# Patient Record
Sex: FEMALE | Race: WHITE
[De-identification: names, ages, dates, MRNs, and addresses within clinical notes are randomized per-mention and may not be internally consistent; named-entity substitution may affect disease eponyms.]

---

## 2017-07-07 ENCOUNTER — HOSPITAL ENCOUNTER (OUTPATIENT)
Dept: HOSPITAL 45 - C.LAB1850 | Age: 82
Discharge: HOME | End: 2017-07-07
Attending: INTERNAL MEDICINE
Payer: COMMERCIAL

## 2017-07-07 DIAGNOSIS — E78.5: Primary | ICD-10-CM

## 2017-07-07 DIAGNOSIS — R31.9: ICD-10-CM

## 2017-07-07 LAB
ALBUMIN/GLOB SERPL: 1.1 {RATIO} (ref 0.9–2)
ALP SERPL-CCNC: 69 U/L (ref 45–117)
ALT SERPL-CCNC: 20 U/L (ref 12–78)
ANION GAP SERPL CALC-SCNC: 4 MMOL/L (ref 3–11)
APPEARANCE UR: CLEAR
AST SERPL-CCNC: 17 U/L (ref 15–37)
BILIRUB UR-MCNC: (no result) MG/DL
BUN SERPL-MCNC: 18 MG/DL (ref 7–18)
BUN/CREAT SERPL: 24.7 (ref 10–20)
CALCIUM SERPL-MCNC: 9.2 MG/DL (ref 8.5–10.1)
CHLORIDE SERPL-SCNC: 107 MMOL/L (ref 98–107)
CHOLEST/HDLC SERPL: 2.6 {RATIO}
CO2 SERPL-SCNC: 30 MMOL/L (ref 21–32)
COLOR UR: YELLOW
CREAT SERPL-MCNC: 0.74 MG/DL (ref 0.6–1.2)
GLOBULIN SER-MCNC: 3.6 GM/DL (ref 2.5–4)
GLUCOSE SERPL-MCNC: 73 MG/DL (ref 70–99)
GLUCOSE UR QL: 95 MG/DL
KETONES UR QL STRIP: 119 MG/DL
MANUAL MICROSCOPIC REQUIRED?: NO
NITRITE UR QL STRIP: (no result)
NITRITE UR QL STRIP: 143 MG/DL (ref 0–150)
PH UR STRIP: 5 [PH] (ref 4.5–7.5)
PH UR: 243 MG/DL (ref 0–200)
POTASSIUM SERPL-SCNC: 3.9 MMOL/L (ref 3.5–5.1)
REVIEW REQ?: NO
SODIUM SERPL-SCNC: 141 MMOL/L (ref 136–145)
SP GR UR STRIP: 1.03 (ref 1–1.03)
UROBILINOGEN UR-MCNC: (no result) MG/DL
VERY LOW DENSITY LIPOPROT CALC: 29 MG/DL
ZZUR CULT IF INDIC CLEAN CATCH: NO

## 2017-07-17 ENCOUNTER — HOSPITAL ENCOUNTER (OUTPATIENT)
Dept: HOSPITAL 45 - C.LAB1850 | Age: 82
Discharge: HOME | End: 2017-07-17
Attending: INTERNAL MEDICINE
Payer: COMMERCIAL

## 2017-07-17 DIAGNOSIS — R31.9: Primary | ICD-10-CM

## 2017-07-31 ENCOUNTER — HOSPITAL ENCOUNTER (OUTPATIENT)
Dept: HOSPITAL 45 - C.PATHSPEC | Age: 82
Discharge: HOME | End: 2017-07-31
Attending: NURSE PRACTITIONER
Payer: COMMERCIAL

## 2017-07-31 DIAGNOSIS — R31.29: Primary | ICD-10-CM

## 2017-08-02 ENCOUNTER — HOSPITAL ENCOUNTER (OUTPATIENT)
Dept: HOSPITAL 45 - C.ULTR | Age: 82
Discharge: HOME | End: 2017-08-02
Attending: NURSE PRACTITIONER
Payer: COMMERCIAL

## 2017-08-02 DIAGNOSIS — R31.29: Primary | ICD-10-CM

## 2017-08-02 DIAGNOSIS — N20.0: ICD-10-CM

## 2017-08-02 NOTE — DIAGNOSTIC IMAGING REPORT
EXAMINATION: RENAL ULTRASOUND



CLINICAL HISTORY: R31.29 [id number] nephrolithiasis



COMPARISON STUDY:  MRI dated 8/26/2016



FINDINGS: The right kidney measures 10.0. The left kidney measures 9.9.  There

is no evidence of hydronephrosis.  There are multiple left renal cysts measuring

up to 3.7 cm in diameter. No solid renal masses are visualized.



No bladder abnormalities are visualized.  Bilateral ureteral jets were

visualized.



Incidental note is made of a septated cyst within the right hepatic lobe. There

is also a 2 cm left adnexal cyst.

                 

IMPRESSION : 

1. Multiple left renal cysts measuring up to 3.7 cm

2. No evidence of obstruction

3. No solid renal masses identified

4. Right lobe hepatic cyst

5. 2 cm left adnexal cyst







Electronically signed by:  Yosi Rosenberg M.D.

8/2/2017 11:03 AM



Dictated Date/Time:  8/2/2017 11:01 AM

## 2017-08-29 ENCOUNTER — HOSPITAL ENCOUNTER (OUTPATIENT)
Dept: HOSPITAL 45 - C.LAB1850 | Age: 82
Discharge: HOME | End: 2017-08-29
Attending: INTERNAL MEDICINE
Payer: COMMERCIAL

## 2017-08-29 DIAGNOSIS — M62.89: ICD-10-CM

## 2017-08-29 DIAGNOSIS — T14.8: Primary | ICD-10-CM

## 2017-08-29 DIAGNOSIS — E53.8: ICD-10-CM

## 2017-08-29 DIAGNOSIS — E55.9: ICD-10-CM
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
## 2017-08-29 LAB
ALBUMIN/GLOB SERPL: 1 {RATIO} (ref 0.9–2)
ALP SERPL-CCNC: 69 U/L (ref 45–117)
ALT SERPL-CCNC: 14 U/L (ref 12–78)
ANION GAP SERPL CALC-SCNC: 4 MMOL/L (ref 3–11)
AST SERPL-CCNC: 17 U/L (ref 15–37)
BASOPHILS # BLD: 0.03 K/UL (ref 0–0.2)
BASOPHILS NFR BLD: 0.5 %
BUN SERPL-MCNC: 16 MG/DL (ref 7–18)
BUN/CREAT SERPL: 22.2 (ref 10–20)
CALCIUM SERPL-MCNC: 9.4 MG/DL (ref 8.5–10.1)
CHLORIDE SERPL-SCNC: 106 MMOL/L (ref 98–107)
CO2 SERPL-SCNC: 30 MMOL/L (ref 21–32)
COMPLETE: YES
CREAT SERPL-MCNC: 0.72 MG/DL (ref 0.6–1.2)
EOSINOPHIL NFR BLD AUTO: 241 K/UL (ref 130–400)
GLOBULIN SER-MCNC: 3.7 GM/DL (ref 2.5–4)
GLUCOSE SERPL-MCNC: 77 MG/DL (ref 70–99)
HCT VFR BLD CALC: 42.7 % (ref 37–47)
IG%: 0.2 %
IMM GRANULOCYTES NFR BLD AUTO: 22.8 %
LYME DISEASE AB IGG: (no result)
LYME DISEASE AB IGM: (no result)
LYMPHOCYTES # BLD: 1.36 K/UL (ref 1.2–3.4)
MAGNESIUM SERPL-MCNC: 2.3 MG/DL (ref 1.8–2.4)
MCH RBC QN AUTO: 32.2 PG (ref 25–34)
MCHC RBC AUTO-ENTMCNC: 32.3 G/DL (ref 32–36)
MCV RBC AUTO: 99.5 FL (ref 80–100)
MONOCYTES NFR BLD: 13.3 %
NEUTROPHILS # BLD AUTO: 1 %
NEUTROPHILS NFR BLD AUTO: 62.2 %
PMV BLD AUTO: 10.3 FL (ref 7.4–10.4)
POTASSIUM SERPL-SCNC: 4.2 MMOL/L (ref 3.5–5.1)
RBC # BLD AUTO: 4.29 M/UL (ref 4.2–5.4)
SODIUM SERPL-SCNC: 140 MMOL/L (ref 136–145)
WBC # BLD AUTO: 5.96 K/UL (ref 4.8–10.8)

## 2018-03-20 LAB
BASOPHILS # BLD: 0.04 K/UL (ref 0–0.2)
BASOPHILS NFR BLD: 0.7 %
BUN SERPL-MCNC: 16 MG/DL (ref 7–18)
CALCIUM SERPL-MCNC: 9.2 MG/DL (ref 8.5–10.1)
CO2 SERPL-SCNC: 30 MMOL/L (ref 21–32)
CREAT SERPL-MCNC: 0.79 MG/DL (ref 0.6–1.2)
EOS ABS #: 0.08 K/UL (ref 0–0.5)
EOSINOPHIL NFR BLD AUTO: 248 K/UL (ref 130–400)
GLUCOSE SERPL-MCNC: 128 MG/DL (ref 70–99)
HCT VFR BLD CALC: 39.8 % (ref 37–47)
HGB BLD-MCNC: 13.2 G/DL (ref 12–16)
IG#: 0 K/UL (ref 0–0.02)
IMM GRANULOCYTES NFR BLD AUTO: 29.3 %
INR PPP: 1 (ref 0.9–1.1)
LYMPHOCYTES # BLD: 1.67 K/UL (ref 1.2–3.4)
MCH RBC QN AUTO: 32.6 PG (ref 25–34)
MCHC RBC AUTO-ENTMCNC: 33.2 G/DL (ref 32–36)
MCV RBC AUTO: 98.3 FL (ref 80–100)
MONO ABS #: 0.59 K/UL (ref 0.11–0.59)
MONOCYTES NFR BLD: 10.4 %
NEUT ABS #: 3.32 K/UL (ref 1.4–6.5)
NEUTROPHILS # BLD AUTO: 1.4 %
NEUTROPHILS NFR BLD AUTO: 58.2 %
PMV BLD AUTO: 9.7 FL (ref 7.4–10.4)
POTASSIUM SERPL-SCNC: 4.5 MMOL/L (ref 3.5–5.1)
PTT PATIENT: 27.3 SECONDS (ref 21–31)
RED CELL DISTRIBUTION WIDTH CV: 12.9 % (ref 11.5–14.5)
RED CELL DISTRIBUTION WIDTH SD: 46.5 FL (ref 36.4–46.3)
SODIUM SERPL-SCNC: 140 MMOL/L (ref 136–145)
WBC # BLD AUTO: 5.7 K/UL (ref 4.8–10.8)

## 2018-03-20 NOTE — DIAGNOSTIC IMAGING REPORT
CHEST 2 VIEWS ROUTINE



CLINICAL HISTORY: 85 years-old Female presenting with preoperative assessment. 



TECHNIQUE: PA and lateral views of the chest were obtained.



COMPARISON: 8/14/2014.



FINDINGS:

Atherosclerosis of the aortic arch. Cardiac silhouette normal in size. Coronary

artery calcification or coronary stents noted. Possible mitral annular

calcification. Lungs and pleural spaces clear. Degenerative changes of the

thoracic spine. Mild compression deformity of a lower thoracic vertebral body

suggesting underlying osteopenia. Deformity and subluxation of the right humeral

head, which may be chronic. Upper abdomen normal.



IMPRESSION:

1.  No acute cardiopulmonary disease.







Electronically signed by:  Chandana Maxwell M.D.

3/20/2018 1:02 PM



Dictated Date/Time:  3/20/2018 1:00 PM

## 2018-03-20 NOTE — PAT MEDICATION INSTRUCTIONS
Service Date


Mar 20, 2018.





Current Home Medication List


Acetaminophen (Tylenol Arthritis Ext Rel), 650 MG PO Q8H


Aspirin (Aspirin Ec), 81 MG PO HS


Cholecalciferol (Vitamin D3), 1 CAP PO QPM


Coenzyme Q10 (Ubidecarenone) (Co Q10), 2 CAP PO QPM


Cyanocobalamin (Vitamin B12), 1 TAB SL QPM


Fish Oil (Omega-3), 1 CAP PO QPM





Medication Instructions


For Your Scheduled Surgery 





- Hold the following medications 2 weeks prior to surgery--stop taking on 04/12/ 18:


Coenzyme Q10 (Ubidecarenone) (Co Q10), 2 CAP PO QPM


Fish Oil ([place]-3), 1 CAP PO QPM








- Take the following medications if needed the morning of surgery:


Acetaminophen (Tylenol Arthritis Ext Rel), 650 MG PO Q8H (can be taken up to 

four hours before surgery)





- Take the following medications as scheduled the night before surgery:


Acetaminophen (Tylenol Arthritis Ext Rel), 650 MG PO Q8H (if needed)


Aspirin (Aspirin Ec), 81 MG PO HS


Cholecalciferol (Vitamin D3), 1 CAP PO QPM


Cyanocobalamin (Vitamin B12), 1 TAB SL QPM








If you have any questions please call us at [phone] or [phone] or 

[phone]

## 2018-04-21 NOTE — HISTORY & PHYSICAL EXAMINATION
DATE OF ADMISSION:  04/26/2018

 

CHIEF COMPLAINT:  Persistent left knee pain and discomfort.

 

HISTORY OF PRESENT ILLNESS:  An 85-year-old female who now presents for

surgical treatment of her left knee.  She has got a long history of left knee

pain and discomfort followed by Dr. Gregory up to this point.  She has had

multiple steroid shots and gel injections, which really do help at all

anymore.  Pain is mostly on the medial side of her knee.  It is increased

with weightbearing.  The more she walks, the more it hurts.  It is really

starting to affect her quality of life and ability to be active.  She would

like to have her left hip fixed.

 

The patient does have an atrial septal aneurysm, followed by Dr. Brown.  He

has cleared her for surgery and do not think this has much significance, or

provides any significant risk for her surgery.

 

PAST MEDICAL HISTORY:

1.  Mild mitral valve prolapse.

2.  Atrial septal aneurysm.

3.  Gastroesophageal reflux disease.

 

PAST SURGICAL HISTORY:

1.  Carpal tunnel release x2.

2.  Cataract surgery.

 

ALLERGIES:  None.

 

CURRENT MEDICINES:

1.  Aspirin 81 mg a day.

2.  Coenzyme Q.

3.  Omega 3.

4.  Vitamin B12.

5.  Vitamin D.

 

SOCIAL HISTORY:  An 85-year-old female.  She is .  Lives alone.  She

is interested in going to [facility] or the [place] postoperatively.

 

FAMILY HISTORY:  Noncontributory.

 

REVIEW OF SYSTEMS:  Negative for diabetes, neurologic problems, vascular and

bleeding disorders.  Denies any current chest pain or shortness of breath. 

She does have this atrial aneurysm, which I do not think is of much cardiac

significance.

 

PHYSICAL EXAMINATION:

GENERAL:  Pleasant, frail, spry elderly female who looks to be in excellent

health.

HEENT:  Benign.

NECK:  Supple.  No lymphadenopathy.

LUNGS:  Clear to auscultation.

HEART:  Has a regular rate and rhythm.

ABDOMEN:  Soft, nontender, nondistended.

EXTREMITIES:  Grossly neurovascularly intact except as follows:  Examination

of the left leg reveals patient walks independently.  She does limp on his

left side.  She has got varus alignment with a varus thrust with

weightbearing.  She has bony hypertrophy medially.  Range of motion 5-120.

 

X-RAYS:  X-rays of the left knee reviewed.  Shows advanced left knee DJD. 

She has complete loss of her medial joint space.  She has subchondral

sclerosis.  She has got osteophytes off the medial femoral condyle and medial

tibial plateau.

 

ASSESSMENT:  An 85-year-old female with advanced left knee degenerative joint

disease and has become less responsive to conservative care.  It is really

affecting her quality of life.  She would like to have her left knee

replaced.

 

PLAN:  We are going to take her to the operating room and do a left total

knee replacement.  The risks and benefits were explained to this patient

including but not limited to DVT, PE, death, infection, neurological injury,

vascular injury, bleeding problem, pain, limited range of motion, stiffness,

failure to relieve symptoms, incomplete relief of symptoms, need for pressor

in the future, fracture, leg length inequality, nerve palsy, etc.  The

patient understands and desires to proceed.  Informed consent was obtained.

 

As far as discharge plans, she is hoping to go to [facility] or the [facility].

## 2018-04-26 ENCOUNTER — HOSPITAL ENCOUNTER (INPATIENT)
Dept: HOSPITAL 45 - C.ACU | Age: 83
LOS: 2 days | Discharge: NURSING, REHAB OR LONG-TERM CARE | DRG: 470 | End: 2018-04-28
Attending: ORTHOPAEDIC SURGERY | Admitting: ORTHOPAEDIC SURGERY
Payer: COMMERCIAL

## 2018-04-26 VITALS
DIASTOLIC BLOOD PRESSURE: 77 MMHG | OXYGEN SATURATION: 95 % | SYSTOLIC BLOOD PRESSURE: 165 MMHG | HEART RATE: 69 BPM | TEMPERATURE: 97.88 F

## 2018-04-26 VITALS
OXYGEN SATURATION: 99 % | TEMPERATURE: 97.88 F | HEART RATE: 88 BPM | SYSTOLIC BLOOD PRESSURE: 122 MMHG | DIASTOLIC BLOOD PRESSURE: 89 MMHG

## 2018-04-26 VITALS
DIASTOLIC BLOOD PRESSURE: 51 MMHG | TEMPERATURE: 98.24 F | SYSTOLIC BLOOD PRESSURE: 115 MMHG | HEART RATE: 68 BPM | OXYGEN SATURATION: 95 %

## 2018-04-26 VITALS
OXYGEN SATURATION: 98 % | TEMPERATURE: 98.06 F | DIASTOLIC BLOOD PRESSURE: 75 MMHG | HEART RATE: 71 BPM | SYSTOLIC BLOOD PRESSURE: 135 MMHG

## 2018-04-26 VITALS — OXYGEN SATURATION: 98 % | SYSTOLIC BLOOD PRESSURE: 138 MMHG | HEART RATE: 69 BPM | DIASTOLIC BLOOD PRESSURE: 75 MMHG

## 2018-04-26 VITALS — OXYGEN SATURATION: 96 % | DIASTOLIC BLOOD PRESSURE: 64 MMHG | SYSTOLIC BLOOD PRESSURE: 149 MMHG | HEART RATE: 62 BPM

## 2018-04-26 VITALS — HEART RATE: 73 BPM | DIASTOLIC BLOOD PRESSURE: 74 MMHG | OXYGEN SATURATION: 98 % | SYSTOLIC BLOOD PRESSURE: 138 MMHG

## 2018-04-26 VITALS — SYSTOLIC BLOOD PRESSURE: 157 MMHG | DIASTOLIC BLOOD PRESSURE: 78 MMHG

## 2018-04-26 VITALS
BODY MASS INDEX: 23.51 KG/M2 | HEIGHT: 59 IN | HEIGHT: 59 IN | BODY MASS INDEX: 23.51 KG/M2 | WEIGHT: 116.62 LBS | WEIGHT: 116.62 LBS

## 2018-04-26 VITALS
TEMPERATURE: 97.52 F | SYSTOLIC BLOOD PRESSURE: 130 MMHG | OXYGEN SATURATION: 99 % | DIASTOLIC BLOOD PRESSURE: 82 MMHG | HEART RATE: 69 BPM

## 2018-04-26 VITALS — OXYGEN SATURATION: 95 %

## 2018-04-26 DIAGNOSIS — Z79.82: ICD-10-CM

## 2018-04-26 DIAGNOSIS — M17.12: Primary | ICD-10-CM

## 2018-04-26 DIAGNOSIS — Z79.899: ICD-10-CM

## 2018-04-26 DIAGNOSIS — K21.9: ICD-10-CM

## 2018-04-26 PROCEDURE — 0SRD0J9 REPLACEMENT OF LEFT KNEE JOINT WITH SYNTHETIC SUBSTITUTE, CEMENTED, OPEN APPROACH: ICD-10-PCS | Performed by: ORTHOPAEDIC SURGERY

## 2018-04-26 RX ADMIN — Medication SCH MCG: at 21:00

## 2018-04-26 RX ADMIN — FERROUS GLUCONATE SCH MG: 324 TABLET ORAL at 17:55

## 2018-04-26 RX ADMIN — CEFAZOLIN SCH MLS/MIN: 10 INJECTION, POWDER, FOR SOLUTION INTRAVENOUS at 17:37

## 2018-04-26 RX ADMIN — KETOROLAC TROMETHAMINE SCH MG: 15 INJECTION INTRAMUSCULAR; INTRAVENOUS at 13:29

## 2018-04-26 RX ADMIN — Medication SCH INTER.UNIT: at 21:00

## 2018-04-26 RX ADMIN — DOCUSATE SODIUM SCH MG: 100 CAPSULE, LIQUID FILLED ORAL at 21:16

## 2018-04-26 RX ADMIN — DEXTROSE MONOHYDRATE, SODIUM CHLORIDE, AND POTASSIUM CHLORIDE SCH MLS/HR: 50; 4.5; 1.49 INJECTION, SOLUTION INTRAVENOUS at 13:29

## 2018-04-26 RX ADMIN — Medication SCH MG: at 21:16

## 2018-04-26 RX ADMIN — Medication SCH GM: at 21:00

## 2018-04-26 RX ADMIN — STANDARDIZED SENNA CONCENTRATE SCH MG: 8.6 TABLET ORAL at 21:00

## 2018-04-26 RX ADMIN — ACETAMINOPHEN SCH MG: 500 TABLET, COATED ORAL at 21:26

## 2018-04-26 RX ADMIN — DEXTROSE MONOHYDRATE, SODIUM CHLORIDE, AND POTASSIUM CHLORIDE SCH MLS/HR: 50; 4.5; 1.49 INJECTION, SOLUTION INTRAVENOUS at 23:13

## 2018-04-26 RX ADMIN — ACETAMINOPHEN SCH MG: 500 TABLET, COATED ORAL at 13:29

## 2018-04-26 RX ADMIN — KETOROLAC TROMETHAMINE SCH MG: 15 INJECTION INTRAMUSCULAR; INTRAVENOUS at 21:17

## 2018-04-26 NOTE — ANESTHESIOLOGY PROGRESS NOTE
Anesthesia Post Op Note


Date & Time


Apr 26, 2018 at 09:11





Vital Signs


Pain Intensity:  0





Vital Signs Past 12 Hours








  Date Time  Temp Pulse Resp B/P (MAP) Pulse Ox O2 Delivery O2 Flow Rate FiO2


 


4/26/18 06:50 36.4 69 18 130/82 99 Room Air  











Notes


Mental Status:  alert / awake / arousable, participated in evaluation


Pt Amnestic to Procedure:  Yes


Nausea / Vomiting:  adequately controlled


Pain:  adequately controlled


Airway Patency, RR, SpO2:  stable & adequate


BP & HR:  stable & adequate


Hydration State:  stable & adequate


Neuraxial Anesthesia:  was administered, sensory block is resolving


Anesthetic Complications:  no major complications apparent

## 2018-04-26 NOTE — DIAGNOSTIC IMAGING REPORT
L KNEE 1 OR 2 VIEWS ROUTINE



CLINICAL HISTORY: Degenerative arthritis     



COMPARISON: April 21, 2018



DISCUSSION: There are postsurgical changes of a total left knee arthroplasty and

patellar resurfacing. The femoral tibial components appear well seated. There

are overlying skin staples. There is air within soft tissues consistent with

history of recent surgery.    



IMPRESSION: Postsurgical changes of a total left knee arthroplasty







Electronically signed by:  Yosi Rosenberg M.D.

4/26/2018 11:35 AM



Dictated Date/Time:  4/26/2018 11:34 AM

## 2018-04-26 NOTE — [facility] POST OPERATIVE BRIEF NOTE
Immediate Operative Summary


Operative Date


Apr 26, 2018.





Pre-Operative Diagnosis





Advanced left knee degenerative joint disease





Post-Operative Diagnosis





Advanced left knee degenerative joint disease





Procedure(s) Performed





Left total knee arthroplasty, cemented





Surgeon


Dr. Dickson





Assistant Surgeon(s)


Johnny Sesay PA-C





Estimated Blood Loss


50cc





Findings


Consistent with Post-Op Diagnosis





Fluids (cc crystalloids)


1500 cc





Specimens





A: Left knee bone and tissue





Drains


None





Anesthesia Type


MAC Spinal Regional





Complication(s)


none





Disposition


Accompanied Pt To Recover:  no


Disposition:  Recovery Room / PACU

## 2018-04-26 NOTE — PROGRESS NOTE
DATE: 04/26/2018

 

SUBJECTIVE:  An 85-year-old female postop from a left knee replacement.  She

is doing well.  Fairly minimal pain so far.  No chest pain or shortness of

breath.  Not feeling dizzy or lightheaded.

 

OBJECTIVE:  

VITAL SIGNS:  Temperature is 36.7.  Vital signs stable.

GENERAL:  Reveals a healthy pleasant elderly female.  Patient is sitting up

in bed and eating and talking to her daughters.  She looks comfortable.

LUNGS:  Clear to auscultation.

HEART:  Regular rate and rhythm.

ABDOMEN:  Soft, nontender, nondistended.

EXTREMITIES:  Grossly neurovascularly intact except as follows:  Examination

of left lower extremity reveals the leg to be well aligned.  Dressing is

clean, dry and intact.  She can dorsiflex and plantarflex her foot

appropriately.  She has got good refill.  She is neurologically intact.

 

X-RAYS:  X-ray of the left knee from recovery room reviewed.  It shows a left

cemented posterior stabilized total knee arthroplasty.  Components looked to

be in good position.  No signs of problems.

 

ASSESSMENT:  An 85-year-old white female postop from a left knee replacement,

doing well.  Pain is controlled.  She is neurologically intact.

 

PLAN:

1.  DVT prophylaxis including thigh-high TEDs, SCDs, and aspirin twice a day.

2.  PT/OT.  Weight bear as tolerated.  Left total knee protocol.

3.  Pain control, doing well with current pain regimen.  We are going to try

and limit narcotics to avoid confusion.

4.  IV antibiotics x24 hours.

5.  Disposition:  She is hoping to be discharged to [facility] for a brief

rehab stay.  If not [facility], she can prefer the [facility] for skilled

nursing.

## 2018-04-26 NOTE — OPERATIVE REPORT
DATE OF OPERATION:  04/26/2018

 

SURGEON:  Silver Dickson MD

 

ASSISTANT:  ANDRE Cuenca.

 

PREOPERATIVE DIAGNOSIS:  Left knee degenerative joint disease.

 

POSTOPERATIVE DIAGNOSIS:  Same.

 

PROCEDURE PERFORMED:  Left cemented posterior stabilized total knee

arthroplasty.

 

COMPLICATIONS:  None.

 

ESTIMATED BLOOD LOSS:  50 mL.

 

FLUID REPLACEMENT:  1500 mL crystalloid fluid replacement.

 

ANESTHESIA:  Spinal with adductor canal block.

 

DRAINS:  None.

 

SPECIMENS:  Left knee sent for pathology.

 

OPERATIVE INDICATIONS:  The patient is an 85-year-old very active an

independent female, who has had a long history of bilateral knee pain and

discomfort, left side quite a bit worse than the right.  She has been through

extensive conservative treatment over the years and this has become less

responsive to conservative care.  X-rays show advanced DJD.  She elected to

do total knee arthroplasty.

 

OPERATIVE FINDINGS:  Operative findings revealed advanced left knee DJD.  She

has extensive and diffuse eburnation of the medial femoral condyle and medial

tibial plateau with grade 4 bone-on-bone disease and osteophytes particularly

in the medial compartment.  She had a large knee joint effusion and fixed

varus deformity to her knee.

 

OPERATIVE IMPLANTS:  Operative implants consisted of:

1.  Biomet Vanguard size 62.5 left posterior stabilized femoral component.

2.  Biomet size 63 tibial tray.

3.  A 10 mm posterior stabilized polyethylene insert.

4.  A 28 x 8 all poly patella.

 

OPERATIVE PROCEDURE:  The patient was taken to the operating room, identified

and placed on the operating table in supine position.  All contact areas were

appropriately padded.  IV antibiotics followed by anesthesia team.  A spinal

anesthetic and adductor canal block had been provided in the holding area. 

Magana catheter was placed in sterile fashion.  A left thigh tourniquet was

then placed and left lower extremity was then prepped and draped in usual

sterile fashion.

 

Left leg was elevated examined Esmarch and tourniquet was placed at 300 mmHg.

 An anterior approach to the left knee was then performed through a

longitudinal incision centered over the patella.  Sharp dissection was

carried through subcutaneous tissues down to the level of the extensor

mechanism.  Medial parapatellar arthrotomy incision was made.  Some

subperiosteal dissection was carried out medially.  The fat pad resected from

beneath the patellar tendon.  Lateral patellofemoral ligament was released. 

Patella was everted, and the knee was flexed.  The osteophytes were taken off

the distal femur.  The ACL was chronically torn.  The PCL was released from

the distal femur.  The tibia subluxated anteriorly.  The external tibial

alignment jig was then placed on the anterior face of the tibia and adjusted

14 mm medially.  Proximal tibial cut was made to move about a millimeter of

bone from the most deficient aspect of the posteromedial tibial plateau. 

Some osteophytes were taken off medial and posteromedially.  Tibia sized to a

size 63.  Attention was then drawn to the femur.

 

The distal femur was entered with a sharp drill bit.  Intramedullary canal

was suctioned.  A left 5 degree valgus cutting guide was placed.  Distal

femoral cutting block was pinned in place.  Distal femoral cut was made to

take an additional 3 mm of bone off distal femur.  The femur was then sized

to a size 62.5.  We downsized this quite a bit and even with this the

medial/lateral dimensions were a bit big for this patient.  I did feel like I

could downsize it any further.  The AP cutting block was then pinned parallel

to the epicondylar axis, which was 5 degrees of external rotation.  The

anterior cut, anterior chamfer cut, posterior cut, posterior chamfer cuts

were made.  A box cutting guide was placed and adjusted slightly lateral. 

The box cut was made.  The knee was flexed.  The remnants of the medial and

lateral meniscus were excised.  The osteophytes were taken off the posterior

aspect of the femur.  A trial femoral component was placed.  Tibial tray was

pinned in maximum external rotation.  The drill and stem punch were used to

create defect in proximal tibia for the tibial tray.  The knee was then

trialed and the 10 mm insert fit most appropriately.  Attention was then

drawn to the patella.

 

The patella was cleaned of all soft tissues.  Patellar thickness measured 20

mm in thickness and cut down to 13.  It was sized to a size 28 patella. 

Locals were drilled for a 28 patella.  Lateral osteophyte was removed. 

Patella button was placed.  Knee was taken through range of motion and the

patella tracked nicely with no thumbs test.  Attention was then drawn toward

placement of permanent components.  All trial components were removed.  A

bone plug was placed in the distal femur to limit blood loss.  A double batch

of [place] G cement was mixed.  A Biomet Vanguard size 62.5 left posterior

stabilized femoral component, size 63 tibial tray, a 10 mm posterior

stabilized polyethylene insert, and a 28 x 8 all poly patella then cemented

in place.  Knee was brought into full extension until cement hardened.  A

final cement check was then performed.  Pericapsular tissues were injected

with a total of 100 mL of a combination of 20 mL of Exparel, 30 mL of normal

saline, 50 mL of 0.25% Marcaine with epinephrine.  The patient did receive 1

gram of tranexamic acid.  The tourniquet was then let down for final

tourniquet time of 61 minutes.  Hemostasis was assured  with use of

electrocautery.  The wound was once again irrigated.  The extensor mechanism

was then closed with a combination of #1 PDS suture and #1 Vicryl suture in a

figure-of-eight fashion.  Extensor mechanism was checked and found to be

intact.  The subcutaneous tissue then closed with #2 Dexon suture in a buried

interrupted fashion.  The skin was closed with skin staples.  Leg was then

cleaned, dried and a sterile dressing of Xeroform, 4 x 4, sterile cast

padding and Ace bandage were applied.  The patient then transferred to the

recovery room in stable condition.  The patient tolerated the procedure well

with no complication.  All needle and sponge counts were correct at the end

of the operation.

 

 

I attest to the content of the Intraoperative Record and any orders documented therein. Any exception
s are noted below.

## 2018-04-27 VITALS — DIASTOLIC BLOOD PRESSURE: 61 MMHG | OXYGEN SATURATION: 97 % | SYSTOLIC BLOOD PRESSURE: 132 MMHG | HEART RATE: 68 BPM

## 2018-04-27 VITALS
HEART RATE: 72 BPM | SYSTOLIC BLOOD PRESSURE: 107 MMHG | TEMPERATURE: 98.78 F | DIASTOLIC BLOOD PRESSURE: 60 MMHG | OXYGEN SATURATION: 94 %

## 2018-04-27 VITALS
HEART RATE: 71 BPM | SYSTOLIC BLOOD PRESSURE: 120 MMHG | TEMPERATURE: 98.6 F | DIASTOLIC BLOOD PRESSURE: 65 MMHG | OXYGEN SATURATION: 99 %

## 2018-04-27 VITALS
OXYGEN SATURATION: 96 % | SYSTOLIC BLOOD PRESSURE: 152 MMHG | DIASTOLIC BLOOD PRESSURE: 67 MMHG | TEMPERATURE: 98.06 F | HEART RATE: 75 BPM

## 2018-04-27 VITALS — TEMPERATURE: 98.42 F

## 2018-04-27 VITALS — OXYGEN SATURATION: 95 %

## 2018-04-27 VITALS
TEMPERATURE: 98.06 F | SYSTOLIC BLOOD PRESSURE: 142 MMHG | HEART RATE: 62 BPM | DIASTOLIC BLOOD PRESSURE: 54 MMHG | OXYGEN SATURATION: 97 %

## 2018-04-27 VITALS
SYSTOLIC BLOOD PRESSURE: 120 MMHG | TEMPERATURE: 98.24 F | HEART RATE: 66 BPM | OXYGEN SATURATION: 96 % | DIASTOLIC BLOOD PRESSURE: 68 MMHG

## 2018-04-27 VITALS — HEART RATE: 71 BPM | SYSTOLIC BLOOD PRESSURE: 135 MMHG | DIASTOLIC BLOOD PRESSURE: 70 MMHG | OXYGEN SATURATION: 95 %

## 2018-04-27 LAB
BUN SERPL-MCNC: 13 MG/DL (ref 7–18)
CALCIUM SERPL-MCNC: 8.1 MG/DL (ref 8.5–10.1)
CO2 SERPL-SCNC: 27 MMOL/L (ref 21–32)
CREAT SERPL-MCNC: 0.8 MG/DL (ref 0.6–1.2)
EOSINOPHIL NFR BLD AUTO: 171 K/UL (ref 130–400)
GLUCOSE SERPL-MCNC: 120 MG/DL (ref 70–99)
HCT VFR BLD CALC: 31.6 % (ref 37–47)
HGB BLD-MCNC: 10.6 G/DL (ref 12–16)
MCH RBC QN AUTO: 32.4 PG (ref 25–34)
MCHC RBC AUTO-ENTMCNC: 33.5 G/DL (ref 32–36)
MCV RBC AUTO: 96.6 FL (ref 80–100)
PMV BLD AUTO: 9.7 FL (ref 7.4–10.4)
POTASSIUM SERPL-SCNC: 3.6 MMOL/L (ref 3.5–5.1)
RED CELL DISTRIBUTION WIDTH CV: 12.2 % (ref 11.5–14.5)
RED CELL DISTRIBUTION WIDTH SD: 43.5 FL (ref 36.4–46.3)
SODIUM SERPL-SCNC: 139 MMOL/L (ref 136–145)
WBC # BLD AUTO: 8.19 K/UL (ref 4.8–10.8)

## 2018-04-27 RX ADMIN — KETOROLAC TROMETHAMINE SCH MG: 15 INJECTION INTRAMUSCULAR; INTRAVENOUS at 01:31

## 2018-04-27 RX ADMIN — LOPERAMIDE HYDROCHLORIDE PRN MG: 2 CAPSULE ORAL at 20:47

## 2018-04-27 RX ADMIN — FERROUS GLUCONATE SCH MG: 324 TABLET ORAL at 17:52

## 2018-04-27 RX ADMIN — KETOROLAC TROMETHAMINE SCH MG: 15 INJECTION INTRAMUSCULAR; INTRAVENOUS at 08:22

## 2018-04-27 RX ADMIN — Medication SCH TAB: at 08:22

## 2018-04-27 RX ADMIN — Medication SCH GM: at 20:43

## 2018-04-27 RX ADMIN — ACETAMINOPHEN SCH MG: 500 TABLET, COATED ORAL at 21:41

## 2018-04-27 RX ADMIN — LOPERAMIDE HYDROCHLORIDE PRN MG: 2 CAPSULE ORAL at 17:13

## 2018-04-27 RX ADMIN — STANDARDIZED SENNA CONCENTRATE SCH MG: 8.6 TABLET ORAL at 20:37

## 2018-04-27 RX ADMIN — DEXTROSE MONOHYDRATE, SODIUM CHLORIDE, AND POTASSIUM CHLORIDE SCH MLS/HR: 50; 4.5; 1.49 INJECTION, SOLUTION INTRAVENOUS at 08:24

## 2018-04-27 RX ADMIN — Medication SCH MG: at 08:23

## 2018-04-27 RX ADMIN — KETOROLAC TROMETHAMINE SCH MG: 15 INJECTION INTRAMUSCULAR; INTRAVENOUS at 19:28

## 2018-04-27 RX ADMIN — DOCUSATE SODIUM SCH MG: 100 CAPSULE, LIQUID FILLED ORAL at 08:23

## 2018-04-27 RX ADMIN — FERROUS GLUCONATE SCH MG: 324 TABLET ORAL at 12:29

## 2018-04-27 RX ADMIN — Medication SCH MG: at 20:47

## 2018-04-27 RX ADMIN — KETOROLAC TROMETHAMINE SCH MG: 15 INJECTION INTRAMUSCULAR; INTRAVENOUS at 13:19

## 2018-04-27 RX ADMIN — ACETAMINOPHEN SCH MG: 500 TABLET, COATED ORAL at 05:28

## 2018-04-27 RX ADMIN — DOCUSATE SODIUM SCH MG: 100 CAPSULE, LIQUID FILLED ORAL at 20:37

## 2018-04-27 RX ADMIN — CEFAZOLIN SCH MLS/MIN: 10 INJECTION, POWDER, FOR SOLUTION INTRAVENOUS at 01:31

## 2018-04-27 RX ADMIN — Medication SCH INTER.UNIT: at 20:43

## 2018-04-27 RX ADMIN — PANTOPRAZOLE SCH MG: 40 TABLET, DELAYED RELEASE ORAL at 08:24

## 2018-04-27 RX ADMIN — FERROUS GLUCONATE SCH MG: 324 TABLET ORAL at 08:22

## 2018-04-27 RX ADMIN — ACETAMINOPHEN SCH MG: 500 TABLET, COATED ORAL at 13:20

## 2018-04-27 RX ADMIN — Medication SCH MCG: at 20:43

## 2018-04-27 RX ADMIN — LOPERAMIDE HYDROCHLORIDE PRN MG: 2 CAPSULE ORAL at 15:03

## 2018-04-27 NOTE — ANESTHESIOLOGY PROGRESS NOTE
Anesthesia Post Op Note


Date & Time


Apr 27, 2018 at 07:31





Vital Signs


Pain Intensity:  0.0





Vital Signs Past 12 Hours








  Date Time  Temp Pulse Resp B/P (MAP) Pulse Ox O2 Delivery O2 Flow Rate FiO2


 


4/27/18 03:47 36.8 66 16 120/68 (85) 96 Room Air  


 


4/26/18 23:15      Room Air  


 


4/26/18 22:55 36.8 68 16 115/51 (72) 95 Room Air  











Notes


Mental Status:  alert / awake / arousable, participated in evaluation


Pt Amnestic to Procedure:  Yes


Nausea / Vomiting:  adequately controlled


Pain:  adequately controlled


Airway Patency, RR, SpO2:  stable & adequate


BP & HR:  stable & adequate


Hydration State:  stable & adequate


Neuraxial Anesthesia:  was administered, sensory block resolved


Anesthetic Complications:  no major complications apparent

## 2018-04-27 NOTE — PROGRESS NOTE
DATE: 04/27/2018

 

SUBJECTIVE:  This is an 85-year-old white female postop day 1 from a left

knee replacement.  She is doing pretty well.  Pain is controlled.  No chest

pain or shortness of breath.  Not feeling dizzy or lightheaded.

 

OBJECTIVE:

VITAL SIGNS:  Temperature is 36.8.  Vital signs stable.

 

PHYSICAL EXAMINATION:

GENERAL:  Physical examination reveals shows a pleasant elderly female.  She

is sitting up in bed, looks pretty comfortable.  She is awake, alert, and

oriented.

EXTREMITIES:  Examination of the left leg reveals the dressing to be clean,

dry, and intact.  She can dorsiflex and plantarflex her foot appropriately. 

She is neurologically intact.

 

LABORATORY DATA:  Labs are pending.

 

ASSESSMENT:  This is an 85-year-old female postop day 1 from a left knee

replacement, doing reasonably well.  Pain is controlled.  She is

neurologically intact.

 

PLAN:

1.  DVT prophylaxis including thigh-high TEDs, SCDs, and aspirin twice a day.

2.  PT/OT.  Weightbear as tolerated.  Left total knee protocol.

3.  Pain control.  Doing reasonably well with current pain regimen.

4.  Disposition.  She is hoping to be discharged to [facility] for a brief

rehab stay.  If does get into [facility], then she will look into the

[facility].

## 2018-04-27 NOTE — DISCHARGE INSTRUCTIONS
Discharge Instructions


Date of Service


Apr 27, 2018.





Admission


Reason for Admission:  Left Knee Degenerative Joint Disease





Discharge


Discharge Diagnosis / Problem:  Left Knee Replacement





Discharge Goals


Goal(s):  Decrease discomfort, Improve function, Increase independence, Improve 

disease control, Therapeutic intervention





Activity Recommendations


Activity Level:  Assistance Required


Therapies:  Physical Therapy, Occupational Therapy


Weightbearing Status:  Left weightbearing





.





Additional Information


Patient informed of condition:  Yes


Advance Directives:  Yes


DNR:  No


Level of Care:  Acute Rehab


Communicable Disease:  No


Prognosis:  Improving





Instructions / Follow-Up


Instructions / Follow-Up





ACTIVITY RECOMMENDATIONS:





Physical Therapy:





*  You will go to physical therapy three times each week for four to six weeks 

after


    your surgery in order to regain your knee range of motion and to retrain 

your knee


    to work properly.  


*  It is just as important to make sure you are getting your knee perfectly 

straight as


    it is to regain your knee bend.


*  Taking a pain pill an hour before therapy can help you have a more 

productive and 


    comfortable therapy session.





Home Exercise:





*  You were shown a series of exercises (heel props, heel slides, etc.) in the 

hospital.


    Do these exercises three to four times each day including the exercises you 

were 


    shown in physical therapy.





Walking:





*  Get up and walk several times each day.  For the first four weeks, try not 

to stand or


    walk for more than one hour at a time.  If you do stand or walk for more 

than one hour,


    you will not hurt anything, but your knee and leg will likely swell.  


*  As you feel comfortable, you may change from the walker or crutches to a 

cane and 


    then to independent walking.








MEDICATIONS:





New Medicine:  





*  You will likely be taking one or more of these medications:


   1.  Tramadol - A quick and shorter-acting pain medication.  Take one to two


        tablets every four to six hours to lessen your pain.


   2.  Iron Sulfate - Take two times each day for the month after surgery to 


        help you replace the blood lost during surgery.


   3.  Aspirin - Thins your blood to lessen the chance of forming a blood clot.





*  The most common side effects of pain medicine and iron are nausea and 

constipation.


    If nausea or constipation is too much of a problem or if you have any 

questions about


    your new medicines or doses, call [facility] at [phone].  We


    will try to help you manage these issues.





VERY IMPORTANT TO READ AND REVIEW"





Pain:





*  The immediate post-operative period after knee replacement surgery is often 

quite painful.


*  You are given a prescription for pain medicine.  You should take it, as 

directed, when you 


    need it, especially before physical therapy and before going to bed.  Pain 

that interferes


    with sleep is very common and can last several months.


*  You will likely need pain medicine for the first four to six weeks.  It will 

not stop all of the


    pain.  The pain will lessen and as you feel better, you may change to 

milder pain medicine


    such as Tylenol.  


*  The most common side effects of pain medicine are nausea and constipation, 

so don't take


    more than you need.








SPECIAL CARE INSTRUCTIONS:





TEDs/Elastic Stockings:





*  The white elastic stockings help limit swelling and prevent blood clots from


    forming in your legs.  The more you wear them, the more they work.


*  Wear them for six weeks after knee replacement surgery and four weeks


    after partial knee replacement.





Prevention of Infection:





*  Take antibiotics one hour before any dental cleaning, dental work, urological


    procedure, gastrointestinal procedure or any invasive surgery in order to


    prevent your new joint from getting infected.  


*  You may get the antibiotics from the doctor performing the procedure or you 


    may call our office at [phone] before and we will call in a 

prescription 


    to the pharmacy of your choice.





Things to Watch For:





*  Drainage from the incision site that occurs more than one week after your 

surgery.


*  Severely increased knee/leg pain or swelling.


*  Increased redness at the incision site.


*  Fever above 102 degrees Fahrenheit.


*  Unusual chest pain or shortness of breath.


*  Unusual pain or burning with urination.





Call [facility] at [phone] with any of the above problems 

or 


if you have any questions about your medicines or recovery.








FOLLOW UP VISIT:





Make an appointment to see your doctor for approximately two weeks after surgery


for a progress check and staple removal by calling the office at [phone].








Current Hospital Diet


Patient's current hospital diet: Regular Diet





Discharge Diet


Recommended Diet:  Regular Diet





Procedures


Procedures Performed:  


Left total knee arthroplasty, cemented





Pending Studies


Studies pending at discharge:  no





Medical Emergencies








.


Who to Call and When:





Medical Emergencies:  If at any time you feel your situation is an emergency, 

please call [phone] immediately.





.





Non-Emergent Contact


Non-Emergency issues call your:  Surgeon





.


.








"Provider Documentation" section prepared by Silver Dickson.








.





Core Measure Problem


Core Measures:  None

## 2018-04-28 VITALS
SYSTOLIC BLOOD PRESSURE: 110 MMHG | OXYGEN SATURATION: 94 % | DIASTOLIC BLOOD PRESSURE: 66 MMHG | TEMPERATURE: 98.24 F | HEART RATE: 64 BPM

## 2018-04-28 VITALS
OXYGEN SATURATION: 94 % | SYSTOLIC BLOOD PRESSURE: 110 MMHG | TEMPERATURE: 98.24 F | DIASTOLIC BLOOD PRESSURE: 66 MMHG | HEART RATE: 64 BPM

## 2018-04-28 RX ADMIN — Medication SCH TAB: at 08:27

## 2018-04-28 RX ADMIN — Medication SCH MG: at 08:28

## 2018-04-28 RX ADMIN — DOCUSATE SODIUM SCH MG: 100 CAPSULE, LIQUID FILLED ORAL at 08:26

## 2018-04-28 RX ADMIN — ACETAMINOPHEN SCH MG: 500 TABLET, COATED ORAL at 05:38

## 2018-04-28 RX ADMIN — FERROUS GLUCONATE SCH MG: 324 TABLET ORAL at 08:27

## 2018-04-28 RX ADMIN — KETOROLAC TROMETHAMINE SCH MG: 15 INJECTION INTRAMUSCULAR; INTRAVENOUS at 02:08

## 2018-04-28 RX ADMIN — KETOROLAC TROMETHAMINE SCH MG: 15 INJECTION INTRAMUSCULAR; INTRAVENOUS at 07:46

## 2018-04-28 RX ADMIN — PANTOPRAZOLE SCH MG: 40 TABLET, DELAYED RELEASE ORAL at 08:27

## 2018-04-28 RX ADMIN — KETOROLAC TROMETHAMINE SCH MG: 15 INJECTION INTRAMUSCULAR; INTRAVENOUS at 07:41

## 2018-04-28 NOTE — PROGRESS NOTE
DATE: 04/28/2018

 

SUBJECTIVE:  This is an 85-year-old white female postop day 2 from a left

knee replacement.  She is doing much better this morning.  A couple of loose

stool yesterday, but better this morning.  Pain is controlled.  No chest pain

or shortness of breath.  Not feeling dizzy or lightheaded.

 

OBJECTIVE:

VITAL SIGNS:  Temperature 36.8.  Vital signs stable.

 

PHYSICAL EXAMINATION:

GENERAL:  Physical examination reveals a pleasant elderly female.  I did wake

her this morning.  She looks comfortable.

EXTREMITIES:  Examination of the left leg reveals the incision to be clean,

dry, and intact.  No significant drainage.  Fairly mild swelling.  Calf is

soft and supple.  She is neurologically intact.

 

ASSESSMENT:  This is an 85-year-old female postop day 2 from a left knee

replacement, doing pretty well.  Pain is controlled.

 

PLAN:

1.  DVT prophylaxis including thigh-high TEDs, SCDs, and aspirin twice a day.

2.  PT/OT.  Weightbear as tolerated.  Left total knee protocol.

3.  Pain control, doing pretty well with current pain regimen.

4.  Disposition.  Plan to discharge to [facility] once medically stable and

bed available.

## 2023-08-31 NOTE — DIAGNOSTIC IMAGING REPORT
KUB



HISTORY:      R31.29 [id number]



COMPARISON: Chest and abdominal series 8/14/2014.



FINDINGS: The bowel gas pattern is unremarkable. There are no dilated loops of

small bowel to suggest an obstruction.  No renal calculi. No ureteral calculi.

Calcifications in the deep pelvis likely represent phleboliths. These remain

unchanged. No pneumoperitoneum or pneumatosis.



IMPRESSION:  

No renal or ureteral stones.







Electronically signed by:  Bryn Sims M.D.

8/2/2017 10:38 AM



Dictated Date/Time:  8/2/2017 10:33 AM She was seen a month ago. If ear pain , I would recommend to be seen here . If no discomfort, we can refer to Audiology and then ENT if needed.  Referral in for Audiology